# Patient Record
Sex: FEMALE | Race: WHITE | Employment: UNEMPLOYED | URBAN - METROPOLITAN AREA
[De-identification: names, ages, dates, MRNs, and addresses within clinical notes are randomized per-mention and may not be internally consistent; named-entity substitution may affect disease eponyms.]

---

## 2018-02-13 ENCOUNTER — HOSPITAL ENCOUNTER (EMERGENCY)
Facility: CLINIC | Age: 43
Discharge: HOME | End: 2018-02-13
Attending: EMERGENCY MEDICINE | Admitting: EMERGENCY MEDICINE
Payer: COMMERCIAL

## 2018-02-13 VITALS
HEART RATE: 82 BPM | DIASTOLIC BLOOD PRESSURE: 50 MMHG | TEMPERATURE: 98.5 F | RESPIRATION RATE: 16 BRPM | OXYGEN SATURATION: 94 % | SYSTOLIC BLOOD PRESSURE: 129 MMHG

## 2018-02-13 DIAGNOSIS — Z76.5 MALINGERING: ICD-10-CM

## 2018-02-13 DIAGNOSIS — F60.3 BORDERLINE PERSONALITY DISORDER (H): ICD-10-CM

## 2018-02-13 DIAGNOSIS — G47.9 SLEEP DISTURBANCE: ICD-10-CM

## 2018-02-13 LAB
AMPHETAMINES UR QL SCN: NEGATIVE
BARBITURATES UR QL: NEGATIVE
BENZODIAZ UR QL: NEGATIVE
CANNABINOIDS UR QL SCN: NEGATIVE
COCAINE UR QL: NEGATIVE
HCG UR QL: NEGATIVE
OPIATES UR QL SCN: POSITIVE
PCP UR QL SCN: NEGATIVE

## 2018-02-13 PROCEDURE — 90791 PSYCH DIAGNOSTIC EVALUATION: CPT

## 2018-02-13 PROCEDURE — 80307 DRUG TEST PRSMV CHEM ANLYZR: CPT | Performed by: EMERGENCY MEDICINE

## 2018-02-13 PROCEDURE — 81025 URINE PREGNANCY TEST: CPT | Performed by: EMERGENCY MEDICINE

## 2018-02-13 PROCEDURE — 99285 EMERGENCY DEPT VISIT HI MDM: CPT | Mod: 25

## 2018-02-13 NOTE — ED AVS SNAPSHOT
Emergency Department    [address] [facility] [address]    Phone:  [phone]    Fax:  [phone]                                       Elisa Parker   MRN: [id number]    Department:   Emergency Department   Date of Visit:  2/13/2018           Patient Information     Date Of Birth          1975        Your diagnoses for this visit were:     Sleep disturbance        You were seen by Sheldon Hawk MD.      Follow-up Information     Follow up with Jessica Aden. Call today.    Specialty:  General Surgery    Contact information:    [facility]  [address] [facility] [address]          Follow up with nearest ER.    Why:  As needed, If symptoms worsen        Discharge Instructions       No signs of any immediately dangerous condition are identified.  We strongly recommend that you follow up with outpatient resources as discussed.  No new medications are indicated from the emergency department based on today's evaluation.  Welcome back for crisis at any hour.    24 Hour Appointment Hotline       To make an appointment at any [facility], call [phone] ([phone]). If you don't have a family doctor or clinic, we will help you find one. [facility] are conveniently located to serve the needs of you and your family.             Review of your medicines      Our records show that you are taking the medicines listed below. If these are incorrect, please call your family doctor or clinic.        Dose / Directions Last dose taken    Ascorbic Acid 1000 MG Chew        Refills:  0        calcium carbonate 1250 MG tablet   Commonly known as:  OS- mg Tribe. Ca   Dose:  500 mg        Take 500 mg by mouth 2 times daily   Refills:  0        cholecalciferol 5000 UNITS Caps        Refills:  0        DHA COMPLETE 200 MG capsule   Generic drug:  Docosahexaenoic Acid        Refills:  0        LAMICTAL 200 MG tablet   Dose:  200 mg   Generic drug:  lamoTRIgine         Take 200 mg by mouth daily   Refills:  0        levothyroxine 125 MCG tablet   Commonly known as:  LEVOXYL   Dose:  125 mcg   Quantity:  30 tablet        Take 1 tablet (125 mcg) by mouth daily   Refills:  0        RISPERDAL PO   Dose:  1 mg        Take 1 mg by mouth   Refills:  0        traZODone 100 MG tablet   Commonly known as:  DESYREL   Dose:  300 mg        Take 300 mg by mouth At Bedtime   Refills:  0        VALTREX PO   Dose:  500 mg        Take 500 mg by mouth   Refills:  0        vitamin E 100 UNIT capsule   Commonly known as:  TOCOPHEROL   Dose:  100 Units        Take 100 Units by mouth daily   Refills:  0        ZOFRAN ODT PO   Dose:  4 mg        Take 4 mg by mouth   Refills:  0                Procedures and tests performed during your visit     Drug abuse screen 77 urine (FL, RH, SH)    HCG qualitative urine      Orders Needing Specimen Collection     None      Pending Results     No orders found from 2/11/2018 to 2/14/2018.            Pending Culture Results     No orders found from 2/11/2018 to 2/14/2018.            Pending Results Instructions     If you had any lab results that were not finalized at the time of your Discharge, you can call the ED Lab Result RN at [phone]. You will be contacted by this team for any positive Lab results or changes in treatment. The nurses are available 7 days a week from 10A to 6:30P.  You can leave a message 24 hours per day and they will return your call.        Test Results From Your Hospital Stay        2/13/2018  3:39 PM      Component Results     Component Value Ref Range & Units Status    Amphetamine Qual Urine Negative NEG^Negative Final    Cutoff for a negative amphetamine is 500 ng/mL or less.    Barbiturates Qual Urine Negative NEG^Negative Final    Cutoff for a negative barbiturate is 200 ng/mL or less.    Benzodiazepine Qual Urine Negative NEG^Negative Final    Cutoff for a negative benzodiazepine is 200 ng/mL or less.    Cannabinoids Qual Urine  Negative NEG^Negative Final    Cutoff for a negative cannabinoid is 50 ng/mL or less.    Cocaine Qual Urine Negative NEG^Negative Final    Cutoff for a negative cocaine is 300 ng/mL or less.    Opiates Qualitative Urine Positive (A) NEG^Negative Final    Cutoff for a positive opiate is greater than 300 ng/mL. This is an unconfirmed   screening result to be used for medical purposes only.      PCP Qual Urine Negative NEG^Negative Final    Cutoff for a negative PCP is 25 ng/mL or less.         2/13/2018  3:25 PM      Component Results     Component Value Ref Range & Units Status    HCG Qual Urine Negative NEG^Negative Final    This test is for screening purposes.  Results should be interpreted along with   the clinical picture.  Confirmation testing is available if warranted by   ordering [id number], HCG Quantitative Pregnancy.                  Clinical Quality Measure: Blood Pressure Screening     Your blood pressure was checked while you were in the emergency department today. The last reading we obtained was  BP: 129/50 . Please read the guidelines below about what these numbers mean and what you should do about them.  If your systolic blood pressure (the top number) is less than 120 and your diastolic blood pressure (the bottom number) is less than 80, then your blood pressure is normal. There is nothing more that you need to do about it.  If your systolic blood pressure (the top number) is 120-139 or your diastolic blood pressure (the bottom number) is 80-89, your blood pressure may be higher than it should be. You should have your blood pressure rechecked within a year by a primary care provider.  If your systolic blood pressure (the top number) is 140 or greater or your diastolic blood pressure (the bottom number) is 90 or greater, you may have high blood pressure. High blood pressure is treatable, but if left untreated over time it can put you at risk for heart attack, stroke, or kidney failure. You should have  "your blood pressure rechecked by a primary care provider within the next 4 weeks.  If your provider in the emergency department today gave you specific instructions to follow-up with your doctor or provider even sooner than that, you should follow that instruction and not wait for up to 4 weeks for your follow-up visit.        Thank you for choosing [facility]       Thank you for choosing [facility] for your care. Our goal is always to provide you with excellent care. Hearing back from our patients is one way we can continue to improve our services. Please take a few minutes to complete the written survey that you may receive in the mail after you visit with us. Thank you!        [email or web address] Information     [employer] lets you send messages to your doctor, view your test results, renew your prescriptions, schedule appointments and more. To sign up, go to [email or web address] . Click on \"Log in\" on the left side of the screen, which will take you to the Welcome page. Then click on \"Sign up Now\" on the right side of the page.     You will be asked to enter the access code listed below, as well as some personal information. Please follow the directions to create your username and password.     Your access code is: [email or web address]  Expires: 2018  4:07 PM     Your access code will  in 90 days. If you need help or a new code, please call your [facility] clinic or [phone].        Care EveryWhere ID     This is your Care EveryWhere ID. This could be used by other organizations to access your [facility] medical records  [id number]        Equal Access to Services     FUNMI LEMON : Hadii aad ku hadasho Soomaali, waaxda luqadaha, qaybta kaalmada adeegyada, joseph dickson . So [facility] [phone].    ATENCIÓN: Si habla español, tiene a roque disposición servicios gratuitos de asistencia lingüística. Llame al [phone].    We comply with applicable federal civil rights laws and [place] laws. We do " not discriminate on the basis of race, color, national origin, age, disability, sex, sexual orientation, or gender identity.            After Visit Summary       This is your record. Keep this with you and show to your community pharmacist(s) and doctor(s) at your next visit.

## 2018-02-13 NOTE — ED AVS SNAPSHOT
Emergency Department    [address] [place] [address]    Phone:  [phone]    Fax:  [phone]                                       Elisa Parker   MRN: [id number]    Department:   Emergency Department   Date of Visit:  2/13/2018           After Visit Summary Signature Page     I have received my discharge instructions, and my questions have been answered. I have discussed any challenges I see with this plan with the nurse or doctor.    ..........................................................................................................................................  Patient/Patient Representative Signature      ..........................................................................................................................................  Patient Representative Print Name and Relationship to Patient    ..................................................               ................................................  Date                                            Time    ..........................................................................................................................................  Reviewed by Signature/Title    ...................................................              ..............................................  Date                                                            Time

## 2018-02-13 NOTE — ED NOTES
Bed: [facility]  Expected date:   Expected time:   Means of arrival:   Comments:  Franci [facility] Crisis eval bipolar 42 female

## 2018-02-13 NOTE — ED PROVIDER NOTES
"  History     Chief Complaint:  \"Sleep\"    HPI   Elisa Parker is a 42 year old female who presents by EMS for evaluation of \"sleep\".  Patient is a poor historian and not willing to talk much with me which limits her history.  History was supplemented by paramedics.  She reported he has a history of bipolar.  Cording to paramedics, the patient went to [facility] ED earlier today for evaluation of sleep troubles and was discharged.  Is unclear how much if any workup was done or the specific events during that ED visit.  She was discharged and went to a nearby hotel.  From there she called 911 and requested to be transferred here to [facility].  She refuses to answer questions to me regarding substance abuse, suicidal thoughts, or hallucinations.  She states \"I have already told everybody so many times.\"    Allergies:  Penicillins  Sulfa drugs     Medications:    Trazodone  Lamictal  Risperdal  Valtrex  Levothyroxine    Past Medical History:    Bipolar 1 disorder  Hypothyroidism  Obstructive sleep apnea    Past Surgical History:    Right ACL repair    Family History:    Asthma  Heart disease    Social History:  Smoking status: Never smoker  Alcohol use: No   Marital Status:  Single [1]     Review of Systems   Unable to perform ROS: Other   Review of systems limited as patient refuses to answer most of my questions    Physical Exam     Patient Vitals for the past 24 hrs:   BP Temp Temp src Pulse Resp SpO2   02/13/18 1444 129/50 98.5  F (36.9  C) Temporal 82 16 94 %      Physical Exam  General: woman recumbent in BH1  HENT: mucous membranes moist  Eyes: PERRL without nystagmus  CV: extremities well perfused, regular rhythm  Resp:  normal effort  GI: abdomen soft and nontender, no guarding  MSK: no bony tenderness   Skin: appropriately warm and dry  Neuro: alert, clear speech, oriented, normal tone in extremities, ambulatory with steady gait  Psych: Poor eye contact, refuses to answer most questions, unable to fully assess " "due to her uncooperativeness though she is not agitated or combative    Emergency Department Course   Laboratory:  Drug abuse screen 77 urine: positive for opiates  HCG Qualitative Urine: negative    Emergency Department Course:  The patient arrived in the emergency department via EMS.   Past medical records, nursing notes, and vitals reviewed.  1500: I performed an exam of the patient and obtained history, as documented above.     1540: DEC assessed the patient.    1607: I rechecked the patient. Explained findings to the patient.    1708: I rechecked the patient. Explained findings to the patient.      Findings and plan explained to the Patient. Patient discharged home with instructions regarding supportive care, medications, and reasons to return. The importance of close follow-up was reviewed.      Impression & Plan    Medical Decision Making:  I attempted to review her visit to [facility] though this is not available in care everywhere, perhaps due to the fact that it was so recent is not yet electronically registered.  Regardless, the patient has very vague symptoms.  Initially she would hardly talk, though later she talked in more detail though repeated efforts depend on a specific complaint that she wished for us to address were unsuccessful.  She initially repeatedly denied feeling suicidal or homicidal, there was no evidence of hallucination or pressured speech or juliet.  She seems to wish to be admitted though the motive is unclear.  She reports having outpatient mental health team.  She was evaluated by DEC who felt quite strongly that she would not benefit from hospitalization.  Patient expressed discontent about the word \"consent\" being used by the registration team regarding her consent to being evaluated, citing concerns that this phrases used with respect to sexual consent as well.  Medical precipitants of her symptoms were considered but are not suspected and emergent workup is not indicated.  I spoke " with DEC on several occasions and again repeatedly denied feeling suicidal, though upon discharge she told the nurse that she felt suicidal and did not feel that she should be discharged.  I have concerned about manipulative behavior and potential malingering.  She is welcome back for acute crisis at any hour and was discharged.      Diagnosis:    ICD-10-CM    1. Sleep disturbance G47.9    2. Borderline personality disorder F60.3    3. Malingering Z76.5      Disposition:  discharged to home    Francia Darden  2/13/2018    EMERGENCY DEPARTMENT    I, Francia Darden, am serving as a scribe at 3:00 PM on 2/13/2018 to document services personally performed by Sheldon Hawk MD based on my observations and the provider's statements to me.       Sheldon Hawk MD  02/13/18 5911

## 2018-02-13 NOTE — DISCHARGE INSTRUCTIONS
No signs of any immediately dangerous condition are identified.  We strongly recommend that you follow up with outpatient resources as discussed.  No new medications are indicated from the emergency department based on today's evaluation.  Welcome back for crisis at any hour.

## 2018-02-14 NOTE — ED NOTES
"RN attempted to d/c patient. She became tearful & asking about consent forms- states she was offered a consent form and to her that meant \"consent for sex\" which she was not willing to give. Patient informed that she can talk to registration to clarify her consent for treatment (registration came to bedside).    Statements: \"have you ever been out of your mind? Because I am about to be out of my mind and I just want help. Last time I was this way, I was on the 26th floor of my apartment in [place], hanging out the window. And I decided to take pills instead.\"     Patient is jotting notes down on pen and paper.    Writer informed MD Hawk of new comments, as well as DEC (Martina).   "
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
## 2018-06-26 ENCOUNTER — HOSPITAL ENCOUNTER (EMERGENCY)
Facility: CLINIC | Age: 43
Discharge: HOME | End: 2018-06-26
Attending: EMERGENCY MEDICINE | Admitting: EMERGENCY MEDICINE
Payer: COMMERCIAL

## 2018-06-26 VITALS
SYSTOLIC BLOOD PRESSURE: 124 MMHG | RESPIRATION RATE: 16 BRPM | TEMPERATURE: 98.4 F | BODY MASS INDEX: 47.09 KG/M2 | HEIGHT: 66 IN | OXYGEN SATURATION: 97 % | WEIGHT: 293 LBS | DIASTOLIC BLOOD PRESSURE: 79 MMHG | HEART RATE: 57 BPM

## 2018-06-26 DIAGNOSIS — F29 PSYCHOSIS, UNSPECIFIED PSYCHOSIS TYPE (H): ICD-10-CM

## 2018-06-26 LAB
ANION GAP SERPL CALCULATED.3IONS-SCNC: 6 MMOL/L (ref 3–14)
BASOPHILS # BLD AUTO: 0 10E9/L (ref 0–0.2)
BASOPHILS NFR BLD AUTO: 0.2 %
BUN SERPL-MCNC: 20 MG/DL (ref 7–30)
CALCIUM SERPL-MCNC: 8.5 MG/DL (ref 8.5–10.1)
CHLORIDE SERPL-SCNC: 106 MMOL/L (ref 94–109)
CO2 SERPL-SCNC: 29 MMOL/L (ref 20–32)
CREAT SERPL-MCNC: 0.86 MG/DL (ref 0.52–1.04)
DIFFERENTIAL METHOD BLD: NORMAL
EOSINOPHIL # BLD AUTO: 0.3 10E9/L (ref 0–0.7)
EOSINOPHIL NFR BLD AUTO: 2.8 %
ERYTHROCYTE [DISTWIDTH] IN BLOOD BY AUTOMATED COUNT: 12.5 % (ref 10–15)
GFR SERPL CREATININE-BSD FRML MDRD: 72 ML/MIN/1.7M2
GLUCOSE SERPL-MCNC: 101 MG/DL (ref 70–99)
HCT VFR BLD AUTO: 41.3 % (ref 35–47)
HGB BLD-MCNC: 13.9 G/DL (ref 11.7–15.7)
IMM GRANULOCYTES # BLD: 0 10E9/L (ref 0–0.4)
IMM GRANULOCYTES NFR BLD: 0.3 %
LYMPHOCYTES # BLD AUTO: 1.5 10E9/L (ref 0.8–5.3)
LYMPHOCYTES NFR BLD AUTO: 16 %
MCH RBC QN AUTO: 30.5 PG (ref 26.5–33)
MCHC RBC AUTO-ENTMCNC: 33.7 G/DL (ref 31.5–36.5)
MCV RBC AUTO: 91 FL (ref 78–100)
MONOCYTES # BLD AUTO: 0.6 10E9/L (ref 0–1.3)
MONOCYTES NFR BLD AUTO: 6.6 %
NEUTROPHILS # BLD AUTO: 7.1 10E9/L (ref 1.6–8.3)
NEUTROPHILS NFR BLD AUTO: 74.1 %
NRBC # BLD AUTO: 0 10*3/UL
NRBC BLD AUTO-RTO: 0 /100
PLATELET # BLD AUTO: 251 10E9/L (ref 150–450)
POTASSIUM SERPL-SCNC: 3.6 MMOL/L (ref 3.4–5.3)
RBC # BLD AUTO: 4.55 10E12/L (ref 3.8–5.2)
SODIUM SERPL-SCNC: 141 MMOL/L (ref 133–144)
WBC # BLD AUTO: 9.5 10E9/L (ref 4–11)

## 2018-06-26 PROCEDURE — 80048 BASIC METABOLIC PNL TOTAL CA: CPT | Performed by: EMERGENCY MEDICINE

## 2018-06-26 PROCEDURE — 90791 PSYCH DIAGNOSTIC EVALUATION: CPT

## 2018-06-26 PROCEDURE — 96360 HYDRATION IV INFUSION INIT: CPT

## 2018-06-26 PROCEDURE — 25000128 H RX IP 250 OP 636: Performed by: EMERGENCY MEDICINE

## 2018-06-26 PROCEDURE — 96361 HYDRATE IV INFUSION ADD-ON: CPT

## 2018-06-26 PROCEDURE — 85025 COMPLETE CBC W/AUTO DIFF WBC: CPT | Performed by: EMERGENCY MEDICINE

## 2018-06-26 PROCEDURE — 99285 EMERGENCY DEPT VISIT HI MDM: CPT | Mod: 25

## 2018-06-26 RX ADMIN — SODIUM CHLORIDE 1000 ML: 9 INJECTION, SOLUTION INTRAVENOUS at 19:19

## 2018-06-26 ASSESSMENT — ENCOUNTER SYMPTOMS
CONFUSION: 1
DIARRHEA: 1
JOINT SWELLING: 1
FATIGUE: 1
ARTHRALGIAS: 1
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
## 2018-06-26 NOTE — ED PROVIDER NOTES
History     Chief Complaint:  Dehydration    HPI   Elisa Parker is a 42 year old female with a history of hypothyroidism and bipolar I disorder who presents with dehydration. The patient reports that she has felt dehydrated, fatigued, and confused for the last week while visiting [facility]. She states that she did not have access to proper nutrition and hydration while in [place] which likely led to her condition, prompting her to seek evaluation in the ED immediately upon returning from her flight 1 hour ago. She reports that she got lost in the [facility] airport, felt delusional, did not know who to trust, and was unable to differentiate between what was real and what was not. She states that in that time she was also unable to take her mental health medications, Lamictal and Zoloft. She notes that she had diarrhea once while in [place] and states that she is not urinating enough. She does report that she has a safe place to stay and that she spoke to her sponsor recently about her addictive behaviors. She notes that she feels somewhat better after eating and drinking on her flight home. She denies suicidal or homicidal thoughts. Of note, She also states that she has swelling in her left ankle which she sprained almost 1 month ago and spotting she attributes to her recent IUD.    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   Gastrointestinal: Positive for diarrhea (resolved).   Genitourinary:  "Positive for decreased urine volume and vaginal bleeding (spotting).   Musculoskeletal: Positive for arthralgias (left ankle sprain) and joint swelling (left ankle).   Psychiatric/Behavioral: Positive for confusion. Negative for suicidal ideas.        No homicidal ideas   All other systems reviewed and are negative.    Physical Exam     Patient Vitals for the past 24 hrs:   BP Temp Temp src Pulse Resp SpO2 Height Weight   06/26/18 2147 124/79 - - 57 16 97 % - -   06/26/18 2042 137/77 - - 63 16 99 % - -   06/26/18 1733 (!) 156/91 98.4  F (36.9  C) Oral 89 16 96 % 1.676 m (5' 6\") 134.3 kg (296 lb)       Physical Exam  SKIN:  Warm, dry.  HEMATOLOGIC/IMMUNOLOGIC/LYMPHATIC:  No pallor.  HENT:  Moist oral mucosa.  EYES:  Conjunctivae normal.  CARDIOVASCULAR:  Regular rate and rhythm.  RESPIRATORY:  No respiratory distress, breath sounds equal and normal.  GASTROINTESTINAL:  Soft, nontender abdomen.  MUSCULOSKELETAL:  Obese body habitus.  NEUROLOGIC:  Alert, conversant.  PSYCHIATRIC:  Normal mood and affect.  No apparent psychotic features.  Normal eye contact.  Normal insight and judgement.      Emergency Department Course   Laboratory:  CBC: WNL (WBC 9.5, HGB 13.9, )  BMP: Glucose 101 (H), o/w WNL (Creatinine 0.86)    Interventions:  1919: NS 1L IV Bolus    Emergency Department Course:  Past medical records, nursing notes, and vitals reviewed.  1816: I performed an exam of the patient and obtained history, as documented above.  IV inserted and blood drawn.    2104: DEC assessed the patient.    2127: I spoke with DEC regarding their assessment of the patient.    2146: I rechecked the patient. Explained findings to the patient.    Findings and plan explained to the patient. Patient discharged home with instructions regarding supportive care, medications, and reasons to return. The importance of close follow-up was reviewed.     Impression & Plan    Medical Decision Making:  Elisa Parker is a 42 year old female who " presents with what she feels might be some kind of psychotic break and malnutrition during her time in [place]. Hard to know exactly what happened during her trip. Medically, her screening tests were unrevealing and she has been feeing improved with respect to nutrition. She underwent DEC assessment and both myself and the DEC  agreed she did not require admission to the hospital for mental health stabilization. We recommend follow up with her mental health providers.     Diagnosis:    ICD-10-CM   1. Psychosis, unspecified psychosis type F29     Disposition:  discharged to home    Geno Meghann  6/26/2018    EMERGENCY DEPARTMENT    Geno DSOUZA, am serving as a scribe at 6:16 PM on 6/26/2018 to document services personally performed by Len Stoddard MD based on my observations and the provider's statements to me.      Len Stoddard MD  06/27/18 0918

## 2018-06-26 NOTE — ED AVS SNAPSHOT
Emergency Department    [address] [place] [address]    Phone:  [phone]    Fax:  [phone]                                       Elisa Parker   MRN: [id number]    Department:   Emergency Department   Date of Visit:  6/26/2018           After Visit Summary Signature Page     I have received my discharge instructions, and my questions have been answered. I have discussed any challenges I see with this plan with the nurse or doctor.    ..........................................................................................................................................  Patient/Patient Representative Signature      ..........................................................................................................................................  Patient Representative Print Name and Relationship to Patient    ..................................................               ................................................  Date                                            Time    ..........................................................................................................................................  Reviewed by Signature/Title    ...................................................              ..............................................  Date                                                            Time

## 2018-06-26 NOTE — ED AVS SNAPSHOT
Emergency Department    [address] [place] [address]    Phone:  [phone]    Fax:  [phone]                                       Elisa Parker   MRN: [id number]    Department:   Emergency Department   Date of Visit:  6/26/2018           Patient Information     Date Of Birth          1975        Your diagnoses for this visit were:     Psychosis, unspecified psychosis type        You were seen by Len Stoddard MD.      Follow-up Information     Please follow up.    Why:  follow up with your mental health providers        Discharge Instructions         Psychosis  Psychosis is a symptom of certain mental health problems. It involves perceiving reality differently from those around you. The difference between reality and what you think become blurred in your mind. Other mental health conditions, physical diseases, traumatic experiences, or drugs and toxins may bring on psychotic symptoms or behavior.  There are different kinds of psychosis:    Drug-induced because of alcohol, methamphetamine, cocaine, LSD, PCP, or others    Bipolar disorder    Depression    Schizophrenia    Dementia  Symptoms  The symptoms of psychosis may not all be the same for each person. But they usually involve:    Hallucinations. Seeing, hearing, feeling, or even tasting or smelling things that are not there    Delusions. Believing something that is not true, or false beliefs that are not part of a person's [employer]ist or cultural background.  There may also be disturbances in thinking, speech, and behavior, which can include:    Hearing voices that others do not hear    Seeing things that others do not see    Racing thoughts    Lack of energy    Feeling very fearful    Disorganized speech    Intentional or unintentional bodily harm to others    Paranoia    Trouble thinking or concentrating clearly    Depression, feeling suicidal    Insomnia    Withdrawal from those around you  Treatment for psychosis  depends on the cause. Medicine, with or without psychotherapy, is often used.  Home care    Find a healthcare provider and therapist who meet your needs.  Seek help when you feel like your symptoms are returning    Be certain to tell each of your healthcare providers about all of the prescription drugs, over-the-counter medicines, and supplements you take. Certain supplements interact with medicines and can result in dangerous side effects. Ask your pharmacist when you have questions about drug interactions.    Be sure to take your medicine as directed even if you think you don't need it.    Follow-up with lab tests as advised by your healthcare provider.    Talk with your family about your feelings and thoughts. Ask them to help you recognize any behavior changes so you can get help and, if needed, medicines can be adjusted.  Follow-up care  Follow up with your counselor, therapist or psychiatrist as advised.  Call 911  Call 911 if you:    Have suicidal thoughts, a suicide plan, and the means to carry out the plan    Have troubled breathing    Are very confused    Are very drowsy or have trouble awakening    Faint or lose consciousness    Have a rapid heart rate, very low heart rate, or a new irregular heart rate    Have a seizure  When to seek medical advice  Call your healthcare provider right away if any of these occur:    Gradual or rapid return of psychotic symptoms    Feeling like you want to harm yourself or another    Feeling extremely depressed    Feeling very anxious, agitated, or angry    Feeling out of control or being controlled by others    Unable to care for yourself    Seeing things or hearing voices that you know aren't real  Date Last Reviewed: 9/29/2015 2000-2017 [employer]. [address] [place] [address], [place] [address]. All rights reserved. This information is not intended as a substitute for professional medical care. Always follow your healthcare professional's  instructions.          24 Hour Appointment Hotline       To make an appointment at any [facility], call [phone] ([phone]). If you don't have a family doctor or clinic, we will help you find one. [facility] are conveniently located to serve the needs of you and your family.             Review of your medicines      Our records show that you are taking the medicines listed below. If these are incorrect, please call your family doctor or clinic.        Dose / Directions Last dose taken    Ascorbic Acid 1000 MG Chew        Refills:  0        calcium carbonate 500 MG tablet   Commonly known as:  OS- mg [place]. Ca   Dose:  500 mg        Take 500 mg by mouth 2 times daily   Refills:  0        cholecalciferol 5000 units Caps        Refills:  0        DHA COMPLETE 200 MG capsule   Generic drug:  Docosahexaenoic Acid        Refills:  0        LAMICTAL 200 MG tablet   Dose:  200 mg   Generic drug:  lamoTRIgine        Take 200 mg by mouth daily   Refills:  0        levothyroxine 125 MCG tablet   Commonly known as:  LEVOXYL   Dose:  125 mcg   Quantity:  30 tablet        Take 1 tablet (125 mcg) by mouth daily   Refills:  0        RISPERDAL PO   Dose:  1 mg        Take 1 mg by mouth   Refills:  0        traZODone 100 MG tablet   Commonly known as:  DESYREL   Dose:  300 mg        Take 300 mg by mouth At Bedtime   Refills:  0        VALTREX PO   Dose:  500 mg        Take 500 mg by mouth   Refills:  0        vitamin E 100 UNIT capsule   Commonly known as:  TOCOPHEROL   Dose:  100 Units        Take 100 Units by mouth daily   Refills:  0        ZOFRAN ODT PO   Dose:  4 mg        Take 4 mg by mouth   Refills:  0                Procedures and tests performed during your visit     Basic metabolic panel    CBC with platelets differential    Peripheral IV catheter      Orders Needing Specimen Collection     None      Pending Results     No orders found from 6/24/2018 to 6/27/2018.            Pending Culture  Results     No orders found from 6/24/2018 to 6/27/2018.            Pending Results Instructions     If you had any lab results that were not finalized at the time of your Discharge, you can call the ED Lab Result RN at [phone]. You will be contacted by this team for any positive Lab results or changes in treatment. The nurses are available 7 days a week from 10A to 6:30P.  You can leave a message 24 hours per day and they will return your call.        Test Results From Your Hospital Stay        6/26/2018  7:32 PM      Component Results     Component Value Ref Range & Units Status    WBC 9.5 4.0 - 11.0 10e9/L Final    RBC Count 4.55 3.8 - 5.2 10e12/L Final    Hemoglobin 13.9 11.7 - 15.7 g/dL Final    Hematocrit 41.3 35.0 - 47.0 % Final    MCV 91 78 - 100 fl Final    MCH 30.5 26.5 - 33.0 pg Final    MCHC 33.7 31.5 - 36.5 g/dL Final    RDW 12.5 10.0 - 15.0 % Final    Platelet Count 251 150 - 450 10e9/L Final    Diff Method Automated Method  Final    % Neutrophils 74.1 % Final    % Lymphocytes 16.0 % Final    % Monocytes 6.6 % Final    % Eosinophils 2.8 % Final    % Basophils 0.2 % Final    % Immature Granulocytes 0.3 % Final    Nucleated RBCs 0 0 /100 Final    Absolute Neutrophil 7.1 1.6 - 8.3 10e9/L Final    Absolute Lymphocytes 1.5 0.8 - 5.3 10e9/L Final    Absolute Monocytes 0.6 0.0 - 1.3 10e9/L Final    Absolute Eosinophils 0.3 0.0 - 0.7 10e9/L Final    Absolute Basophils 0.0 0.0 - 0.2 10e9/L Final    Abs Immature Granulocytes 0.0 0 - 0.4 10e9/L Final    Absolute Nucleated RBC 0.0  Final         6/26/2018  7:55 PM      Component Results     Component Value Ref Range & Units Status    Sodium 141 133 - 144 mmol/L Final    Potassium 3.6 3.4 - 5.3 mmol/L Final    Chloride 106 94 - 109 mmol/L Final    Carbon Dioxide 29 20 - 32 mmol/L Final    Anion Gap 6 3 - 14 mmol/L Final    Glucose 101 (H) 70 - 99 mg/dL Final    Urea Nitrogen 20 7 - 30 mg/dL Final    Creatinine 0.86 0.52 - 1.04 mg/dL Final    GFR Estimate 72  >60 mL/min/1.7m2 Final    Non  GFR Calc    GFR Estimate If Black 87 >60 mL/min/1.7m2 Final    African American GFR Calc    Calcium 8.5 8.5 - 10.1 mg/dL Final                Clinical Quality Measure: Blood Pressure Screening     Your blood pressure was checked while you were in the emergency department today. The last reading we obtained was  BP: 137/77 . Please read the guidelines below about what these numbers mean and what you should do about them.  If your systolic blood pressure (the top number) is less than 120 and your diastolic blood pressure (the bottom number) is less than 80, then your blood pressure is normal. There is nothing more that you need to do about it.  If your systolic blood pressure (the top number) is 120-139 or your diastolic blood pressure (the bottom number) is 80-89, your blood pressure may be higher than it should be. You should have your blood pressure rechecked within a year by a primary care provider.  If your systolic blood pressure (the top number) is 140 or greater or your diastolic blood pressure (the bottom number) is 90 or greater, you may have high blood pressure. High blood pressure is treatable, but if left untreated over time it can put you at risk for heart attack, stroke, or kidney failure. You should have your blood pressure rechecked by a primary care provider within the next 4 weeks.  If your provider in the emergency department today gave you specific instructions to follow-up with your doctor or provider even sooner than that, you should follow that instruction and not wait for up to 4 weeks for your follow-up visit.        Thank you for choosing [facility]       Thank you for choosing [facility] for your care. Our goal is always to provide you with excellent care. Hearing back from our patients is one way we can continue to improve our services. Please take a few minutes to complete the written survey that you may receive in the mail after you visit with  "us. Thank you!        [facility] Information     [employer] lets you send messages to your doctor, view your test results, renew your prescriptions, schedule appointments and more. To sign up, go to [email or web address] . Click on \"Log in\" on the left side of the screen, which will take you to the Welcome page. Then click on \"Sign up Now\" on the right side of the page.     You will be asked to enter the access code listed below, as well as some personal information. Please follow the directions to create your username and password.     Your access code is: [email or web address]  Expires: 2018  9:50 PM     Your access code will  in 90 days. If you need help or a new code, please call your [facility] clinic or [phone].        Care EveryWhere ID     This is your Care EveryWhere ID. This could be used by other organizations to access your [facility] medical records  [id number]        Equal Access to Services     FUNMI LEMON : Hadii zaira narayanano Soesau, waaxda luqadaha, qaybta kaalmada adeegyaviridiana, joseph dickson . So [facility] [phone].    ATENCIÓN: Si habla español, tiene a roque disposición servicios gratuitos de asistencia lingüística. Llame al [phone].    We comply with applicable federal civil rights laws and [place] laws. We do not discriminate on the basis of race, color, national origin, age, disability, sex, sexual orientation, or gender identity.            After Visit Summary       This is your record. Keep this with you and show to your community pharmacist(s) and doctor(s) at your next visit.                  "

## 2018-06-27 NOTE — ED NOTES
"Patient states she is \"set with resources\".  Patient did not want any further education or coverage of paperwork.  Patient has plan to uber home.  Ambulatory to exit with steady gait.   "

## 2018-06-27 NOTE — DISCHARGE INSTRUCTIONS
Psychosis  Psychosis is a symptom of certain mental health problems. It involves perceiving reality differently from those around you. The difference between reality and what you think become blurred in your mind. Other mental health conditions, physical diseases, traumatic experiences, or drugs and toxins may bring on psychotic symptoms or behavior.  There are different kinds of psychosis:    Drug-induced because of alcohol, methamphetamine, cocaine, LSD, PCP, or others    Bipolar disorder    Depression    Schizophrenia    Dementia  Symptoms  The symptoms of psychosis may not all be the same for each person. But they usually involve:    Hallucinations. Seeing, hearing, feeling, or even tasting or smelling things that are not there    Delusions. Believing something that is not true, or false beliefs that are not part of a person's [employer] or cultural background.  There may also be disturbances in thinking, speech, and behavior, which can include:    Hearing voices that others do not hear    Seeing things that others do not see    Racing thoughts    Lack of energy    Feeling very fearful    Disorganized speech    Intentional or unintentional bodily harm to others    Paranoia    Trouble thinking or concentrating clearly    Depression, feeling suicidal    Insomnia    Withdrawal from those around you  Treatment for psychosis depends on the cause. Medicine, with or without psychotherapy, is often used.  Home care    Find a healthcare provider and therapist who meet your needs.  Seek help when you feel like your symptoms are returning    Be certain to tell each of your healthcare providers about all of the prescription drugs, over-the-counter medicines, and supplements you take. Certain supplements interact with medicines and can result in dangerous side effects. Ask your pharmacist when you have questions about drug interactions.    Be sure to take your medicine as directed even if you think you don't need  it.    Follow-up with lab tests as advised by your healthcare provider.    Talk with your family about your feelings and thoughts. Ask them to help you recognize any behavior changes so you can get help and, if needed, medicines can be adjusted.  Follow-up care  Follow up with your counselor, therapist or psychiatrist as advised.  Call 911  Call 911 if you:    Have suicidal thoughts, a suicide plan, and the means to carry out the plan    Have troubled breathing    Are very confused    Are very drowsy or have trouble awakening    Faint or lose consciousness    Have a rapid heart rate, very low heart rate, or a new irregular heart rate    Have a seizure  When to seek medical advice  Call your healthcare provider right away if any of these occur:    Gradual or rapid return of psychotic symptoms    Feeling like you want to harm yourself or another    Feeling extremely depressed    Feeling very anxious, agitated, or angry    Feeling out of control or being controlled by others    Unable to care for yourself    Seeing things or hearing voices that you know aren't real  Date Last Reviewed: 9/29/2015 2000-2017 [employer]. [address] [place] [address], [place] [address]. All rights reserved. This information is not intended as a substitute for professional medical care. Always follow your healthcare professional's instructions.

## 2018-10-12 ENCOUNTER — APPOINTMENT (OUTPATIENT)
Dept: GENERAL RADIOLOGY | Facility: CLINIC | Age: 43
End: 2018-10-12
Attending: EMERGENCY MEDICINE

## 2018-10-12 ENCOUNTER — HOSPITAL ENCOUNTER (EMERGENCY)
Facility: CLINIC | Age: 43
Discharge: HOME | End: 2018-10-12
Attending: EMERGENCY MEDICINE | Admitting: EMERGENCY MEDICINE

## 2018-10-12 VITALS
OXYGEN SATURATION: 96 % | DIASTOLIC BLOOD PRESSURE: 58 MMHG | WEIGHT: 293 LBS | BODY MASS INDEX: 48.5 KG/M2 | SYSTOLIC BLOOD PRESSURE: 111 MMHG | RESPIRATION RATE: 16 BRPM | TEMPERATURE: 96.1 F

## 2018-10-12 DIAGNOSIS — G47.9 DIFFICULTY SLEEPING: ICD-10-CM

## 2018-10-12 PROCEDURE — 73630 X-RAY EXAM OF FOOT: CPT | Mod: LT

## 2018-10-12 PROCEDURE — 90791 PSYCH DIAGNOSTIC EVALUATION: CPT

## 2018-10-12 PROCEDURE — 99285 EMERGENCY DEPT VISIT HI MDM: CPT | Mod: 25

## 2018-10-12 NOTE — ED TRIAGE NOTES
Patient alert and oriented times 3 .  Abc intact pt her for mental health . hasn't sleep ed for 36 hours here with friend I was present with the resident during the history and exam.  I discussed the case with the resident and agree with the findings as documented in the assessment and plan. 2-3 weeks ago and hurt left foot .

## 2018-10-12 NOTE — ED AVS SNAPSHOT
[facility] Emergency Department    [address] [facility] [address]    Phone:  [phone]    Fax:  [phone]                                       Elisa Parker   MRN: [id number]    Department:  [facility] Emergency Department   Date of Visit:  10/12/2018           Patient Information     Date Of Birth          1975        Your diagnoses for this visit were:     Difficulty sleeping        You were seen by Ganesh Go MD.      Follow-up Information     Schedule an appointment as soon as possible for a visit with Christos Chau PA-C.    Contact information:    [facility]    [address] [place] [address] [phone]        Follow up with Jessica Aden.    Specialty:  General Surgery    Contact information:    [employer] [address]          Follow up with [facility] Emergency Department.    Specialty:  EMERGENCY MEDICINE    Why:  As needed, If symptoms worsen    Contact information:    [address] [facility] [address] [phone]        Follow up with [facility] .    Contact information:    Call Adult Services Intake  [phone]  Offices open @ 8:30AM.     Crisis line (24/7) [phone]      Discharge References/Attachments     BIPOLAR DISORDER (ENGLISH)      24 Hour Appointment Hotline       To make an appointment at any [facility], call [phone] ([phone]). If you don't have a family doctor or clinic, we will help you find one. [facility] are conveniently located to serve the needs of you and your family.             Review of your medicines      Our records show that you are taking the medicines listed below. If these are incorrect, please call your family doctor or clinic.        Dose / Directions Last dose taken    Ascorbic Acid 1000 MG Chew        Refills:  0        calcium carbonate 500 mg (elemental) 500 MG  tablet   Commonly known as:  OS-LEW   Dose:  500 mg        Take 500 mg by mouth 2 times daily   Refills:  0        cholecalciferol 5000 units Caps        Refills:  0        DHA COMPLETE 200 MG capsule   Generic drug:  Docosahexaenoic Acid        Refills:  0        LAMICTAL 200 MG tablet   Dose:  200 mg   Generic drug:  lamoTRIgine        Take 200 mg by mouth daily   Refills:  0        levothyroxine 125 MCG tablet   Commonly known as:  LEVOXYL   Dose:  125 mcg   Quantity:  30 tablet        Take 1 tablet (125 mcg) by mouth daily   Refills:  0        RISPERDAL PO   Dose:  1 mg        Take 1 mg by mouth   Refills:  0        traZODone 100 MG tablet   Commonly known as:  DESYREL   Dose:  300 mg        Take 300 mg by mouth At Bedtime   Refills:  0        VALTREX PO   Dose:  500 mg        Take 500 mg by mouth   Refills:  0        vitamin E 100 UNIT capsule   Commonly known as:  TOCOPHEROL   Dose:  100 Units        Take 100 Units by mouth daily   Refills:  0        ZOFRAN ODT PO   Dose:  4 mg        Take 4 mg by mouth   Refills:  0                Procedures and tests performed during your visit     Foot XR, G/E 3 views, left      Orders Needing Specimen Collection     None      Pending Results     No orders found from 10/10/2018 to 10/13/2018.            Pending Culture Results     No orders found from 10/10/2018 to 10/13/2018.            Pending Results Instructions     If you had any lab results that were not finalized at the time of your Discharge, you can call the ED Lab Result RN at [phone]. You will be contacted by this team for any positive Lab results or changes in treatment. The nurses are available 7 days a week from 10A to 6:30P.  You can leave a message 24 hours per day and they will return your call.        Test Results From Your Hospital Stay        10/12/2018  2:54 AM      Narrative     XR FOOT LT G/E 3 VW  10/12/2018 2:27 AM     INDICATION: Left foot pain over first/second metatarsal.    COMPARISON:  None.        Impression     IMPRESSION: No fracture or other acute findings. Slight degenerative  changes first MTP joint. On the lateral view there is a corticated,  linear bone density posterior to the distal tibia and fibula which may  be due to old trauma.    FRANCK LUNA MD                Clinical Quality Measure: Blood Pressure Screening     Your blood pressure was checked while you were in the emergency department today. The last reading we obtained was  BP: 111/58 . Please read the guidelines below about what these numbers mean and what you should do about them.  If your systolic blood pressure (the top number) is less than 120 and your diastolic blood pressure (the bottom number) is less than 80, then your blood pressure is normal. There is nothing more that you need to do about it.  If your systolic blood pressure (the top number) is 120-139 or your diastolic blood pressure (the bottom number) is 80-89, your blood pressure may be higher than it should be. You should have your blood pressure rechecked within a year by a primary care provider.  If your systolic blood pressure (the top number) is 140 or greater or your diastolic blood pressure (the bottom number) is 90 or greater, you may have high blood pressure. High blood pressure is treatable, but if left untreated over time it can put you at risk for heart attack, stroke, or kidney failure. You should have your blood pressure rechecked by a primary care provider within the next 4 weeks.  If your provider in the emergency department today gave you specific instructions to follow-up with your doctor or provider even sooner than that, you should follow that instruction and not wait for up to 4 weeks for your follow-up visit.        Thank you for choosing [facility]       Thank you for choosing [facility] for your care. Our goal is always to provide you with excellent care. Hearing back from our patients is one way we can continue to improve our services.  "Please take a few minutes to complete the written survey that you may receive in the mail after you visit with us. Thank you!        [employer] Information     [employer] lets you send messages to your doctor, view your test results, renew your prescriptions, schedule appointments and more. To sign up, go to [email or web address] . Click on \"Log in\" on the left side of the screen, which will take you to the Welcome page. Then click on \"Sign up Now\" on the right side of the page.     You will be asked to enter the access code listed below, as well as some personal information. Please follow the directions to create your username and password.     Your access code is: [email or web address]  Expires: 1/10/2019  5:00 AM     Your access code will  in 90 days. If you need help or a new code, please call your [facility] clinic or [phone].        Care EveryWhere ID     This is your Care EveryWhere ID. This could be used by other organizations to access your [facility] medical records  [id number]        Equal Access to Services     [facility]: Hadii zaira Clayton, [employer], qaybaylin aguilera, joseph dickson . So [facility] [phone].    ATENCIÓN: Si habla español, tiene a roque disposición servicios gratuitos de asistencia lingüística. Llame al [phone].    We comply with applicable federal civil rights laws and [place] laws. We do not discriminate on the basis of race, color, national origin, age, disability, sex, sexual orientation, or gender identity.            After Visit Summary       This is your record. Keep this with you and show to your community pharmacist(s) and doctor(s) at your next visit.                  "

## 2018-10-12 NOTE — ED AVS SNAPSHOT
[facility] Emergency Department    [address]    [facility] [address]    Phone:  [phone]    Fax:  [phone]                                       Elisa Parker   MRN: [id number]    Department:  [facility] Emergency Department   Date of Visit:  10/12/2018           After Visit Summary Signature Page     I have received my discharge instructions, and my questions have been answered. I have discussed any challenges I see with this plan with the nurse or doctor.    ..........................................................................................................................................  Patient/Patient Representative Signature      ..........................................................................................................................................  Patient Representative Print Name and Relationship to Patient    ..................................................               ................................................  Date                                   Time    ..........................................................................................................................................  Reviewed by Signature/Title    ...................................................              ..............................................  Date                                               Time          22EPIC Rev 08/18

## 2021-05-31 ENCOUNTER — RECORDS - HEALTHEAST (OUTPATIENT)
Dept: ADMINISTRATIVE | Facility: CLINIC | Age: 46
End: 2021-05-31

## 2024-10-07 ENCOUNTER — TRANSFERRED RECORDS (OUTPATIENT)
Dept: HEALTH INFORMATION MANAGEMENT | Facility: CLINIC | Age: 49
End: 2024-10-07
Payer: COMMERCIAL

## 2025-01-30 ENCOUNTER — TRANSFERRED RECORDS (OUTPATIENT)
Dept: HEALTH INFORMATION MANAGEMENT | Facility: CLINIC | Age: 50
End: 2025-01-30
Payer: COMMERCIAL

## 2025-07-24 ENCOUNTER — TRANSFERRED RECORDS (OUTPATIENT)
Dept: HEALTH INFORMATION MANAGEMENT | Facility: CLINIC | Age: 50
End: 2025-07-24
Payer: COMMERCIAL

## 2025-07-31 ENCOUNTER — TRANSFERRED RECORDS (OUTPATIENT)
Dept: HEALTH INFORMATION MANAGEMENT | Facility: CLINIC | Age: 50
End: 2025-07-31
Payer: COMMERCIAL

## 2025-08-07 ENCOUNTER — TRANSFERRED RECORDS (OUTPATIENT)
Dept: HEALTH INFORMATION MANAGEMENT | Facility: CLINIC | Age: 50
End: 2025-08-07
Payer: COMMERCIAL